# Patient Record
Sex: FEMALE
[De-identification: names, ages, dates, MRNs, and addresses within clinical notes are randomized per-mention and may not be internally consistent; named-entity substitution may affect disease eponyms.]

---

## 2018-03-12 ENCOUNTER — HOSPITAL ENCOUNTER (OUTPATIENT)
Dept: HOSPITAL 14 - H.ER | Age: 68
Setting detail: OBSERVATION
Discharge: LEFT BEFORE DISCHARGE | End: 2018-03-12
Attending: INTERNAL MEDICINE | Admitting: INTERNAL MEDICINE
Payer: MEDICARE

## 2018-03-12 VITALS — TEMPERATURE: 98.4 F

## 2018-03-12 VITALS — RESPIRATION RATE: 18 BRPM | DIASTOLIC BLOOD PRESSURE: 90 MMHG | SYSTOLIC BLOOD PRESSURE: 157 MMHG | HEART RATE: 71 BPM

## 2018-03-12 VITALS — BODY MASS INDEX: 36.8 KG/M2

## 2018-03-12 DIAGNOSIS — R07.9: Primary | ICD-10-CM

## 2018-03-12 DIAGNOSIS — E11.22: ICD-10-CM

## 2018-03-12 DIAGNOSIS — N18.9: ICD-10-CM

## 2018-03-12 DIAGNOSIS — E78.00: ICD-10-CM

## 2018-03-12 DIAGNOSIS — I12.9: ICD-10-CM

## 2018-03-12 DIAGNOSIS — E78.5: ICD-10-CM

## 2018-03-12 LAB
ALBUMIN SERPL-MCNC: 4 G/DL (ref 3.5–5)
ALBUMIN/GLOB SERPL: 1.2 {RATIO} (ref 1–2.1)
ALT SERPL-CCNC: 63 U/L (ref 9–52)
APTT BLD: 31.1 SECONDS (ref 25.6–37.1)
AST SERPL-CCNC: 60 U/L (ref 14–36)
BASOPHILS # BLD AUTO: 0.1 K/UL (ref 0–0.2)
BASOPHILS NFR BLD: 1.3 % (ref 0–2)
BNP SERPL-MCNC: 433 PG/ML (ref 0–900)
BUN SERPL-MCNC: 15 MG/DL (ref 7–17)
CALCIUM SERPL-MCNC: 9.3 MG/DL (ref 8.4–10.2)
EOSINOPHIL # BLD AUTO: 0.3 K/UL (ref 0–0.7)
EOSINOPHIL NFR BLD: 5.2 % (ref 0–4)
ERYTHROCYTE [DISTWIDTH] IN BLOOD BY AUTOMATED COUNT: 14.2 % (ref 11.5–14.5)
GFR NON-AFRICAN AMERICAN: 40
HGB BLD-MCNC: 11.6 G/DL (ref 12–16)
INR PPP: 1.1 (ref 0.9–1.2)
LYMPHOCYTES # BLD AUTO: 2 K/UL (ref 1–4.3)
LYMPHOCYTES NFR BLD AUTO: 29.5 % (ref 20–40)
MCH RBC QN AUTO: 32 PG (ref 27–31)
MCHC RBC AUTO-ENTMCNC: 33.8 G/DL (ref 33–37)
MCV RBC AUTO: 94.7 FL (ref 81–99)
MONOCYTES # BLD: 1 K/UL (ref 0–0.8)
MONOCYTES NFR BLD: 14.6 % (ref 0–10)
NEUTROPHILS # BLD: 3.3 K/UL (ref 1.8–7)
NEUTROPHILS NFR BLD AUTO: 49.4 % (ref 50–75)
NRBC BLD AUTO-RTO: 0.3 % (ref 0–0)
PLATELET # BLD: 178 K/UL (ref 130–400)
PMV BLD AUTO: 9.7 FL (ref 7.2–11.7)
PROTHROMBIN TIME: 12.3 SECONDS (ref 9.8–13.1)
RBC # BLD AUTO: 3.63 MIL/UL (ref 3.8–5.2)
TROPONIN I SERPL-MCNC: 0.02 NG/ML (ref 0–0.12)
WBC # BLD AUTO: 6.7 K/UL (ref 4.8–10.8)

## 2018-03-12 PROCEDURE — 80053 COMPREHEN METABOLIC PANEL: CPT

## 2018-03-12 PROCEDURE — 85610 PROTHROMBIN TIME: CPT

## 2018-03-12 PROCEDURE — 84484 ASSAY OF TROPONIN QUANT: CPT

## 2018-03-12 PROCEDURE — 99282 EMERGENCY DEPT VISIT SF MDM: CPT

## 2018-03-12 PROCEDURE — 85730 THROMBOPLASTIN TIME PARTIAL: CPT

## 2018-03-12 PROCEDURE — 71045 X-RAY EXAM CHEST 1 VIEW: CPT

## 2018-03-12 PROCEDURE — 85025 COMPLETE CBC W/AUTO DIFF WBC: CPT

## 2018-03-12 PROCEDURE — 83880 ASSAY OF NATRIURETIC PEPTIDE: CPT

## 2018-03-12 NOTE — RAD
HISTORY:

Chest pain 



COMPARISON:

Comparison made with chest radiograph dated 11/13/2016. 



FINDINGS:



LUNGS:

Elevation right hemidiaphragm likely due eventration.  There appears 

to be some mild atelectasis right lung base as well.



Mild biapical pleural thickening



PLEURA:

As above. No pneumothorax apparent.



CARDIOVASCULAR:

Cardiomegaly.



OSSEOUS STRUCTURES:

No significant abnormalities.



VISUALIZED UPPER ABDOMEN:

Normal.



OTHER FINDINGS:

None.



IMPRESSION:

Elevation right hemidiaphragm likely due eventration.  There appears 

to be some mild atelectasis right lung base as well.



Mild biapical pleural thickening

## 2018-03-12 NOTE — ED PDOC
HPI: Chest Pain


Time Seen by Provider: 03/12/18 05:05


Chief Complaint (Nursing): Chest Pain


Chief Complaint (Provider): Chest Pain


History Per: Patient


History/Exam Limitations: no limitations


Onset/Duration Of Symptoms: Hrs (prior to arrival)


Current Symptoms Are (Timing): Still Present


Additional Complaint(s): 


68 year old female with history of diabetes, kidney disease presents to the ED 

complaining of chest pain, onset last night. Patient reports a pressure like 

chest pain in the center of her chest that makes it hard for her to breath. She 

denies fever, cough, vomiting or diarrhea. 











PMd; Dr. Lily Man MD








Past Medical History


Reviewed: Historical Data, Nursing Documentation, Vital Signs


Vital Signs: 


 Last Vital Signs











Temp  98.4 F   03/12/18 05:05


 


Pulse  71   03/12/18 09:00


 


Resp  18   03/12/18 09:00


 


BP  157/90 H  03/12/18 09:00


 


Pulse Ox  99   03/14/18 21:02














- Medical History


PMH: Back Problems, Diabetes, Gall Bladder Disease, HTN, Hypercholesterolemia, 

Hyperlipidemia, Chronic Kidney Disease





- Surgical History


Surgical History: Cholecystectomy





- Family History


Family History: States: Unknown Family Hx





- Social History


Current smoker - smoking cessation education provided: No


Alcohol: None


Drugs: Denies





- Home Medications


Home Medications: 


 Ambulatory Orders











 Medication  Instructions  Recorded


 


Clopidogrel [Plavix] 75 mg PO DAILY #0 tab 11/15/16


 


Famotidine [Pepcid] 20 mg PO DAILY #0 tab 11/15/16


 


Levothyroxine [Synthroid] 88 mcg PO DAILY@0630 #0 tab 11/15/16


 


Losartan Potassium [Cozaar] 100 mg PO DAILY #0 tablet 11/15/16


 


Methyldopa [Aldomet] 250 mg PO DAILY #0 tab 11/15/16


 


Pravastatin Sodium [Pravachol] 20 mg PO DAILY #0 tab 11/15/16


 


Sevelamer Carbonate [Renvela] 0.8 gm PO DAILY #0 packet 11/15/16


 


Spironolactone [Aldactone] 25 mg PO DAILY #0 tab 11/15/16


 


amLODIPine [Norvasc] 10 mg PO DAILY #0 tab 11/15/16


 


cloNIDine 0.2 mg/24 hr 1 patch TD Q7D@0900 #0 patch 11/15/16





[catapres-TTS2 0.2 mg/24 hr]  














- Allergies


Allergies/Adverse Reactions: 


 Allergies











Allergy/AdvReac Type Severity Reaction Status Date / Time


 


No Known Allergies Allergy   Verified 03/12/18 05:05














Review of Systems


ROS Statement: Except As Marked, All Systems Reviewed And Found Negative


Constitutional: Negative for: Fever


Cardiovascular: Positive for: Chest Pain (pressure like )


Respiratory: Negative for: Cough


Gastrointestinal: Negative for: Vomiting, Diarrhea





Physical Exam





- Reviewed


Nursing Documentation Reviewed: Yes


Vital Signs Reviewed: Yes





- Physical Exam


Appears: Positive for: Non-toxic, No Acute Distress (obese)


Head Exam: Positive for: ATRAUMATIC, NORMOCEPHALIC


Skin: Positive for: Normal Color, Warm, Dry


Eye Exam: Positive for: EOMI, Normal appearance, PERRL


Neck: Positive for: Normal, Painless ROM, Supple


Cardiovascular/Chest: Positive for: Regular Rate, Rhythm


Respiratory: Positive for: CNT, Normal Breath Sounds


Extremity: Positive for: Normal ROM.  Negative for: Deformity, Swelling (leg)


Neurologic/Psych: Positive for: Alert, Oriented





- Laboratory Results


Result Diagrams: 


 03/12/18 05:50





 03/12/18 05:50





- ECG


O2 Sat by Pulse Oximetry: 99 (RA)


Pulse Ox Interpretation: Normal





Medical Decision Making


Medical Decision Making: 


Time: 05:43 


Initial Plan: 


--BNP 


--CMP


--Troponin I 


--CBC with differentials 


--PTT 


--Prothrombin Time


--Chest x-ray 


--Cardiac Monitoring








--------------------------------------------------------------------------------

-----------------


Scribe Attestation:


Documented by Marycruz Kemp, acting as a scribe for Reid Chávez MD.





Provider Scribe Attestation:


All medical record entries made by the Scribe were at my direction and 

personally dictated by me. I have reviewed the chart and agree that the record 

accurately reflects my personal performance of the history, physical exam, 

medical decision making, and the department course for this patient. I have 

also personally directed, reviewed, and agree with the discharge instructions 

and disposition.





Disposition





- Clinical Impression


Clinical Impression: 


 Chest pain








- Patient ED Disposition


Is Patient to be Admitted: Transfer of Care





- Disposition


Disposition: Transfer of Care


Disposition Time: 07:00


Condition: FAIR


Patient Signed Over To: Dajuan Sierra


Handoff Comments: pending CBC and results and admission to Dr. Sun

## 2018-03-12 NOTE — ED PDOC
- Laboratory Results


Result Diagrams: 


 03/12/18 05:50





 03/12/18 05:50





- ECG


O2 Sat by Pulse Oximetry: 99 (RA)





Medical Decision Making


Medical Decision Making: 





Patient signed out to me by Dr. Chávez @ 07:00, pending CBC and admission to 

Dr. Sun





























--------------------------------------------------------------------------------

-----------------


Scribe Attestation:


Documented by Jesse Navas, acting as a scribe for Dajuan Sierra MD.





Provider Scribe Attestation:


All medical record entries made by the Scribe were at my direction and 

personally dictated by me. I have reviewed the chart and agree that the record 

accurately reflects my personal performance of the history, physical exam, 

medical decision making, and the department course for this patient. I have 

also personally directed, reviewed, and agree with the discharge instructions 

and disposition.











Disposition





- Clinical Impression


Clinical Impression: 


 Chest pain








- POA


Present On Arrival: None





- Disposition


Disposition: Hospitalized as Observation Patient


Disposition Time: 08:09


Condition: FAIR


Forms:  [employer] (English)

## 2018-03-14 VITALS — OXYGEN SATURATION: 99 %
